# Patient Record
Sex: FEMALE | Race: WHITE | NOT HISPANIC OR LATINO | URBAN - METROPOLITAN AREA
[De-identification: names, ages, dates, MRNs, and addresses within clinical notes are randomized per-mention and may not be internally consistent; named-entity substitution may affect disease eponyms.]

---

## 2021-02-15 ENCOUNTER — TELEHEALTH PROVIDED OTHER THAN IN PATIENT'S HOME (OUTPATIENT)
Dept: URBAN - METROPOLITAN AREA TELEHEALTH 12 | Facility: TELEHEALTH | Age: 76
End: 2021-02-15
Payer: OTHER GOVERNMENT

## 2021-02-15 VITALS — HEIGHT: 62 IN | WEIGHT: 120 LBS

## 2021-02-15 DIAGNOSIS — R10.13 EPIGASTRIC PAIN: ICD-10-CM

## 2021-02-15 DIAGNOSIS — R11.0 NAUSEA: ICD-10-CM

## 2021-02-15 DIAGNOSIS — R93.3 ABNORMAL FINDINGS ON DIAGNOSTIC IMAGING OF OTHER PARTS OF DI: ICD-10-CM

## 2021-02-15 DIAGNOSIS — R19.5 OTHER FECAL ABNORMALITIES: ICD-10-CM

## 2021-02-15 PROCEDURE — 99204 OFFICE O/P NEW MOD 45 MIN: CPT | Mod: GT | Performed by: NURSE PRACTITIONER

## 2021-02-15 NOTE — SERVICEHPINOTES
PATIENT VERIFIED BY DATE OF BIRTH AND NAME. Patient has been consented for this telecommunication visit. Visited ER with black stools and HTN. + Nausea for weeks. Has been taking Nexium 40 mg once daily since visited the ER. Denies vomiting. + Stomach pain which comes and goes. BRTakes Ibuprofen or Tylenol occasionally. BRSlight constipation. Black dark stools. Denies bright red blood in stool. Told to have blood in stool at the ER. BRCT abdomen and pelvis showed moderate hiatal hernia, signs of esophagitis and gastritis.  BRReports hx of hiatal surgery in 2016. BRNot sure when the last colonoscopy was. Denies personal hx of polyps. BRDenies family hx of colon cancer. Hx of cardiac stent x 1 in 2013. Sees Dr. Carranza in [place]. Pt moved here in 09/2020.